# Patient Record
Sex: FEMALE | Race: WHITE | NOT HISPANIC OR LATINO | URBAN - METROPOLITAN AREA
[De-identification: names, ages, dates, MRNs, and addresses within clinical notes are randomized per-mention and may not be internally consistent; named-entity substitution may affect disease eponyms.]

---

## 2017-01-10 ENCOUNTER — OUTPATIENT (OUTPATIENT)
Dept: OUTPATIENT SERVICES | Facility: HOSPITAL | Age: 27
LOS: 1 days | Discharge: HOME | End: 2017-01-10

## 2017-06-27 DIAGNOSIS — R07.9 CHEST PAIN, UNSPECIFIED: ICD-10-CM

## 2018-05-01 ENCOUNTER — EMERGENCY (EMERGENCY)
Facility: HOSPITAL | Age: 28
LOS: 1 days | Discharge: HOME | End: 2018-05-01
Attending: EMERGENCY MEDICINE | Admitting: EMERGENCY MEDICINE

## 2018-05-01 VITALS
TEMPERATURE: 97 F | HEART RATE: 79 BPM | DIASTOLIC BLOOD PRESSURE: 66 MMHG | OXYGEN SATURATION: 99 % | SYSTOLIC BLOOD PRESSURE: 111 MMHG | RESPIRATION RATE: 16 BRPM

## 2018-05-01 DIAGNOSIS — R68.84 JAW PAIN: ICD-10-CM

## 2018-05-01 DIAGNOSIS — K08.89 OTHER SPECIFIED DISORDERS OF TEETH AND SUPPORTING STRUCTURES: ICD-10-CM

## 2018-05-01 DIAGNOSIS — Z79.899 OTHER LONG TERM (CURRENT) DRUG THERAPY: ICD-10-CM

## 2018-05-01 DIAGNOSIS — F17.200 NICOTINE DEPENDENCE, UNSPECIFIED, UNCOMPLICATED: ICD-10-CM

## 2018-05-01 DIAGNOSIS — S02.609A FRACTURE OF MANDIBLE, UNSPECIFIED, INITIAL ENCOUNTER FOR CLOSED FRACTURE: Chronic | ICD-10-CM

## 2018-05-01 DIAGNOSIS — Z86.79 PERSONAL HISTORY OF OTHER DISEASES OF THE CIRCULATORY SYSTEM: ICD-10-CM

## 2018-05-01 DIAGNOSIS — Z87.891 PERSONAL HISTORY OF NICOTINE DEPENDENCE: ICD-10-CM

## 2018-05-01 DIAGNOSIS — K02.9 DENTAL CARIES, UNSPECIFIED: ICD-10-CM

## 2018-05-01 RX ORDER — ALPRAZOLAM 0.25 MG
1 TABLET ORAL
Qty: 0 | Refills: 0 | COMMUNITY

## 2018-05-01 NOTE — ED PROVIDER NOTE - PHYSICAL EXAMINATION
Gen: NAD, pleasant, cooperative  HEENT: NCAT, EOMI, no lymphadenopathy, no pain on palpation of mandible from exterior. On examination of oropharynx, L lower most posterior molar is missing its entire posterior half without any purulent drainage or bleeding  Cards: RRR, S1-S2 present, no G/M/R  Resp: CTAB  Abd: S, NT, ND, no pain on palpation, +BS  Ext: No C/C/E. 2+ radial pulses b/l  Neuro: AOx3, nonfocal

## 2018-05-01 NOTE — ED PROVIDER NOTE - PROGRESS NOTE DETAILS
Pt states she is amenable to receiving a dental block for her pain and following up with the [facility] when it opens later today. Dental resident has come to [facility] Crit area to perform block. Patient to be discharged afterwards to follow-up later today in [facility]. in reference to Procedure / Dental block performed on 5/1/18 (see procedure note): I was present for and supervised the key/critical aspects of the procedures performed during the care of the patient.

## 2018-05-01 NOTE — ED PROVIDER NOTE - ATTENDING CONTRIBUTION TO CARE
27y f h/o svt, spontan L ptx in past p/w worsening LL tooth pain x 3d. H/o LL cracked molar x sev yrs, now w/worsening pain. Started taking PCN she had at home today. Denies f/c, diff swallowing or breathing, drooling, nv, abd pain. Has medical ins but is only covered in [place]. PE: young f nad, ncat, op - +cracked tooth #17, +ttp, mild surrounding gingival erythema, no drainage, no buccal edema, tongue nl, no drooling, no trismus, neck supple no lad, rrr nl s1s2, ctab, ext nl. a/p: LL tooth pain - pt amenable to dental blk - clinic f/u in AM.

## 2018-05-01 NOTE — CONSULT NOTE ADULT - SUBJECTIVE AND OBJECTIVE BOX
Patient is a 27y old  Female who presents with a chief complaint of     HPI: sharp occasional dental pain on lower left side since 4/27.      PAST MEDICAL & SURGICAL HISTORY:  Pneumothorax on left  SVT (supraventricular tachycardia)  Closed fracture of jaw    (   ) heart valve replacement  (   ) joint replacement  (   ) pregnancy    MEDICATIONS  (STANDING):    MEDICATIONS  (PRN):      REVIEW OF SYSTEMS      General:	    Skin/Breast:  	  Ophthalmologic:  	  ENMT:	    Respiratory and Thorax:  	  Cardiovascular:	    Gastrointestinal:	    Genitourinary:	    Musculoskeletal:	    Neurological:	    Psychiatric:	    Hematology/Lymphatics:	    Endocrine:	    Allergic/Immunologic:	    Allergies    No Known Allergies    Intolerances        FAMILY HISTORY:  Family history of acute myocardial infarction (Father)      *SOCIAL HISTORY: (   ) Tobacco; (   ) ETOH    Vital Signs Last 24 Hrs  T(C): 36.2 (01 May 2018 00:52), Max: 36.2 (01 May 2018 00:52)  T(F): 97.1 (01 May 2018 00:52), Max: 97.1 (01 May 2018 00:52)  HR: 79 (01 May 2018 00:52) (79 - 79)  BP: 111/66 (01 May 2018 00:52) (111/66 - 111/66)  BP(mean): --  RR: 16 (01 May 2018 00:52) (16 - 16)  SpO2: 99% (01 May 2018 00:52) (99% - 99%)    LABS:                  Last Dental Visit: <<  >>    EOE:   No TMD, LAD, asymmetry, swelling             TMJ (   ) clicks                     (   ) pops                     (   ) crepitus             Mandible <<FROM>>             Facial bones and MOM <<grossly intact>>             (   ) trismus             (   ) lymphadenopathy             (   ) swelling             (   ) asymmetry             (   ) palpation             (   ) dyspnea             (   ) dysphagia             (   ) loss of consciousness    IOE:     Tooth #18 fractured with gross caries; - palpation and percussion.            <<permanent/primary/mixed>> dentition:            <<grossly intact>> OR             <<multiple carious teeth>> OR             <<multiple missing teeth>>             Dentition Present <<  >>                     Mobility <<  >>                     Caries <<  >>                hard/soft palate:  (   ) palatal torus, <<No pathology noted>>            tongue/FOM <<No pathology noted>>            labial/buccal mucosa <<No pathology noted>>           (   ) percussion           (   ) palpation           (   ) swelling            (   ) abscess           (   ) sinus tract    *DENTAL RADIOGRAPHS: N/A    RADIOLOGY & ADDITIONAL STUDIES:    *ASSESSMENT: Explained to patient that tooth #18 has gross caries and is fractured and needs to be evaluated/follow-up by her private dentist.     *PLAN: dental block    PROCEDURE:   Verbal and written consent given.  Anesthesia: << Administered 1 carpule of Marcaine as left Inferior Alveolar Nerve Block and long buccal. Administered 1 carpule of 2% Lidocaine with 1:100K epinephrine as local infiltration on buccal of #18.  >>   Treatment: <<    >>     RECOMMENDATIONS:  1) << Explained to patient to continue and finish taking prescribed antibiotics and followup with private dentist tomorrow.  >>  2) Dental F/U with outpatient dentist for comprehensive dental care.   3) If any difficulty swallowing/breathing, fever occur, return to ER.     Resident Name, pager # Melodie Schaefer

## 2018-05-01 NOTE — ED PROVIDER NOTE - NS ED ROS FT
Constitutional: No fever, chills, unintended weight loss.  Eyes:  No visual changes, eye pain or discharge.  ENMT:  see hpi  Cardiac:  No chest pain, SOB or edema. No chest pain with exertion.  Respiratory:  No cough or respiratory distress. No hemoptysis. No history of asthma or RAD.  GI:  No nausea, vomiting, diarrhea or abdominal pain.

## 2018-05-02 ENCOUNTER — OUTPATIENT (OUTPATIENT)
Dept: OUTPATIENT SERVICES | Facility: HOSPITAL | Age: 28
LOS: 1 days | Discharge: HOME | End: 2018-05-02

## 2018-05-02 DIAGNOSIS — K02.53 DENTAL CARIES ON PIT AND FISSURE SURFACE PENETRATING INTO PULP: ICD-10-CM

## 2018-05-02 DIAGNOSIS — S02.609A FRACTURE OF MANDIBLE, UNSPECIFIED, INITIAL ENCOUNTER FOR CLOSED FRACTURE: Chronic | ICD-10-CM

## 2019-05-20 NOTE — ED ADULT NURSE NOTE - NS ED NURSE LEVEL OF CONSCIOUSNESS AFFECT
INR not at goal. Medications, chart, and patient findings reviewed. See calendar for adjustments to dose and follow up plan.      
Calm

## 2020-03-08 ENCOUNTER — EMERGENCY (EMERGENCY)
Facility: HOSPITAL | Age: 30
LOS: 0 days | Discharge: HOME | End: 2020-03-08
Attending: EMERGENCY MEDICINE | Admitting: EMERGENCY MEDICINE
Payer: COMMERCIAL

## 2020-03-08 VITALS
TEMPERATURE: 98 F | DIASTOLIC BLOOD PRESSURE: 60 MMHG | SYSTOLIC BLOOD PRESSURE: 108 MMHG | HEART RATE: 87 BPM | RESPIRATION RATE: 16 BRPM | OXYGEN SATURATION: 100 %

## 2020-03-08 VITALS
WEIGHT: 149.91 LBS | RESPIRATION RATE: 18 BRPM | SYSTOLIC BLOOD PRESSURE: 145 MMHG | HEART RATE: 140 BPM | TEMPERATURE: 99 F | DIASTOLIC BLOOD PRESSURE: 65 MMHG | HEIGHT: 66 IN | OXYGEN SATURATION: 98 %

## 2020-03-08 DIAGNOSIS — R00.2 PALPITATIONS: ICD-10-CM

## 2020-03-08 DIAGNOSIS — Z79.899 OTHER LONG TERM (CURRENT) DRUG THERAPY: ICD-10-CM

## 2020-03-08 DIAGNOSIS — S02.609A FRACTURE OF MANDIBLE, UNSPECIFIED, INITIAL ENCOUNTER FOR CLOSED FRACTURE: Chronic | ICD-10-CM

## 2020-03-08 LAB
ALBUMIN SERPL ELPH-MCNC: 4.8 G/DL — SIGNIFICANT CHANGE UP (ref 3.5–5.2)
ALP SERPL-CCNC: 76 U/L — SIGNIFICANT CHANGE UP (ref 30–115)
ALT FLD-CCNC: 33 U/L — SIGNIFICANT CHANGE UP (ref 0–41)
ANION GAP SERPL CALC-SCNC: 13 MMOL/L — SIGNIFICANT CHANGE UP (ref 7–14)
AST SERPL-CCNC: 34 U/L — SIGNIFICANT CHANGE UP (ref 0–41)
BILIRUB SERPL-MCNC: <0.2 MG/DL — SIGNIFICANT CHANGE UP (ref 0.2–1.2)
BUN SERPL-MCNC: 10 MG/DL — SIGNIFICANT CHANGE UP (ref 10–20)
CALCIUM SERPL-MCNC: 9.7 MG/DL — SIGNIFICANT CHANGE UP (ref 8.5–10.1)
CHLORIDE SERPL-SCNC: 101 MMOL/L — SIGNIFICANT CHANGE UP (ref 98–110)
CO2 SERPL-SCNC: 27 MMOL/L — SIGNIFICANT CHANGE UP (ref 17–32)
CREAT SERPL-MCNC: 0.9 MG/DL — SIGNIFICANT CHANGE UP (ref 0.7–1.5)
GLUCOSE SERPL-MCNC: 87 MG/DL — SIGNIFICANT CHANGE UP (ref 70–99)
HCG SERPL-ACNC: <0.6 MIU/ML — SIGNIFICANT CHANGE UP
HCT VFR BLD CALC: 44.1 % — SIGNIFICANT CHANGE UP (ref 37–47)
HGB BLD-MCNC: 14.6 G/DL — SIGNIFICANT CHANGE UP (ref 12–16)
MCHC RBC-ENTMCNC: 30.6 PG — SIGNIFICANT CHANGE UP (ref 27–31)
MCHC RBC-ENTMCNC: 33.1 G/DL — SIGNIFICANT CHANGE UP (ref 32–37)
MCV RBC AUTO: 92.5 FL — SIGNIFICANT CHANGE UP (ref 81–99)
NRBC # BLD: 0 /100 WBCS — SIGNIFICANT CHANGE UP (ref 0–0)
PLATELET # BLD AUTO: 235 K/UL — SIGNIFICANT CHANGE UP (ref 130–400)
POTASSIUM SERPL-MCNC: 4.4 MMOL/L — SIGNIFICANT CHANGE UP (ref 3.5–5)
POTASSIUM SERPL-SCNC: 4.4 MMOL/L — SIGNIFICANT CHANGE UP (ref 3.5–5)
PROT SERPL-MCNC: 7.4 G/DL — SIGNIFICANT CHANGE UP (ref 6–8)
RBC # BLD: 4.77 M/UL — SIGNIFICANT CHANGE UP (ref 4.2–5.4)
RBC # FLD: 13.4 % — SIGNIFICANT CHANGE UP (ref 11.5–14.5)
SODIUM SERPL-SCNC: 141 MMOL/L — SIGNIFICANT CHANGE UP (ref 135–146)
TROPONIN T SERPL-MCNC: <0.01 NG/ML — SIGNIFICANT CHANGE UP
WBC # BLD: 8.82 K/UL — SIGNIFICANT CHANGE UP (ref 4.8–10.8)
WBC # FLD AUTO: 8.82 K/UL — SIGNIFICANT CHANGE UP (ref 4.8–10.8)

## 2020-03-08 PROCEDURE — 71046 X-RAY EXAM CHEST 2 VIEWS: CPT | Mod: 26

## 2020-03-08 PROCEDURE — 93010 ELECTROCARDIOGRAM REPORT: CPT

## 2020-03-08 PROCEDURE — 99285 EMERGENCY DEPT VISIT HI MDM: CPT

## 2020-03-08 RX ORDER — SODIUM CHLORIDE 9 MG/ML
1000 INJECTION, SOLUTION INTRAVENOUS ONCE
Refills: 0 | Status: COMPLETED | OUTPATIENT
Start: 2020-03-08 | End: 2020-03-08

## 2020-03-08 RX ADMIN — SODIUM CHLORIDE 1000 MILLILITER(S): 9 INJECTION, SOLUTION INTRAVENOUS at 20:05

## 2020-03-08 NOTE — ED PROVIDER NOTE - DIAGNOSTIC INTERPRETATION
ER Physician: LEXY Ibanez M.D.  CHEST XRAY INTERPRETATION: lungs clear, heart shadow normal, bony structures intact

## 2020-03-08 NOTE — ED ADULT NURSE NOTE - OBJECTIVE STATEMENT
Patient presents to ED with complaints of mild chest pain that has since resolved and palpitations that started PTA. Patient has history of SVT with spontaneous resolution.

## 2020-03-08 NOTE — ED PROVIDER NOTE - CARE PROVIDER_API CALL
Andrew Palacios; YOANA)  Cardiac Electrophysiology  [address] [place] [address]  Phone: [phone]  Fax: [phone]  Follow Up Time:

## 2020-03-08 NOTE — ED ADULT NURSE NOTE - NS ED NURSE IV DC DT
Complete history and physical was completed today.  Complete and thorough medication reconciliation was performed.  Discussed risks and benefits of medications.  Advised patient on orders and health maintenance.  We discussed old records and old labs if available.  Will request any records not available through epic.  Continue current medications listed on your summary sheet.   08-Mar-2020 21:51

## 2020-03-08 NOTE — ED PROVIDER NOTE - ATTENDING CONTRIBUTION TO CARE
I personally evaluated the patient. I reviewed the Resident’s or Physician Assistant’s note (as assigned above), and agree with the findings and plan except as documented in my note.   30 Y/O F H/O SVT APPROX ONE YEAR AGO WHICH SPONTANEOUSLY CONVERTED NOW C/O PALPITATIONS X 1 HOUR. PT REPORTS HEART RACING AND NOTED HEART RATE TO -150BPM. + ASSOCIATED CHEST TIGHTNESS. NO SOB, DIZZINESS, NAUSEA, DIAPHORESIS. AT TIME OF PRIOR SVT, PT REPORTS TFTS AND ECHO NORMAL. NO LEG EDEMA OR PAIN. NO ABD PAIN. NO RECENT ILLNESS, FEVER CHILLS, URI SXS, COUGH. VITALS NOTED. ALERT OX3 NAD WELL APPEARING. NCAT PERRL. EOMI. OP NORMAL. MMM. NECK SUPPLE. THYROID NORMAL. LUNGS CLEAR B/L. TACHYCARDIA S1S2. ABD- SOFT NONTENDER. NO LEG EDEMA. CN 2-12 INTACT. NEURO EXAM NONFOCAL.

## 2020-03-08 NOTE — ED PROVIDER NOTE - NS ED ROS FT
Eyes:  No visual changes, eye pain or discharge.  ENMT:  No hearing changes, pain, discharge or infections. No neck pain or stiffness.  Cardiac: +palpitations. No chest pain, SOB or edema. No chest pain with exertion.  Respiratory:  No cough or respiratory distress. No hemoptysis. No history of asthma or RAD.  GI:  No nausea, vomiting, diarrhea or abdominal pain.  :  No dysuria, frequency or burning.  MS:  No myalgia, muscle weakness, joint pain or back pain.  Neuro:  No headache or weakness.  No LOC.  Skin:  No skin rash.   Endocrine: No history of thyroid disease or diabetes.

## 2020-03-08 NOTE — ED PROVIDER NOTE - CLINICAL SUMMARY MEDICAL DECISION MAKING FREE TEXT BOX
28 Y/O F H/O ONE EPISODE OF SVT NOW WITH PALPITATIONS. EKG WITH SINUS TACHYCARDIA WHICH IMPROVED IN THE ED. ALL DIAGNOSTIC TESTING REVIEWED. PT DISCHARGED TO FOLOW UP WITH EP AND GIVEN STRICT RETURN INSTRUCTIONS.

## 2020-03-08 NOTE — ED PROVIDER NOTE - PATIENT PORTAL LINK FT
You can access the FollowMyHealth Patient Portal offered by [facility] by registering at the following website: [email or web address]. By joining [employer]’s FollowMyHealth portal, you will also be able to view your health information using other applications (apps) compatible with our system.

## 2020-03-08 NOTE — ED PROVIDER NOTE - PHYSICAL EXAMINATION
CONSTITUTIONAL: Well-developed; well-nourished; in no acute distress.   SKIN: warm, dry  HEAD: Normocephalic; atraumatic.  EYES: PERRL, EOMI, normal sclera and conjunctiva   ENT: No nasal discharge; airway clear.  NECK: Supple; non tender.  CARD: S1, S2 normal; no murmurs, gallops, or rubs. Tachycardia.   RESP: No wheezes, rales or rhonchi.  ABD: soft ntnd  EXT: Normal ROM.  No clubbing, cyanosis or edema. No posterior calf tenderness.   LYMPH: No acute cervical adenopathy.  NEURO: Alert, oriented, grossly unremarkable  PSYCH: Cooperative, appropriate.

## 2020-03-08 NOTE — ED PROVIDER NOTE - OBJECTIVE STATEMENT
29 y F pmh svt pw palpitations. Heart started racing about 1 hour ago. Checked HR which was elevated at that time. Associated with mild chest tightness. Denies fever, chills, cough, sob, leg swelling, nausea, dizziness, LH.

## 2020-03-09 PROBLEM — I47.1 SUPRAVENTRICULAR TACHYCARDIA: Chronic | Status: ACTIVE | Noted: 2018-05-01

## 2020-03-09 PROBLEM — J93.9 PNEUMOTHORAX, UNSPECIFIED: Chronic | Status: ACTIVE | Noted: 2018-05-01

## 2020-03-26 ENCOUNTER — TRANSCRIPTION ENCOUNTER (OUTPATIENT)
Age: 30
End: 2020-03-26

## 2020-04-26 ENCOUNTER — MESSAGE (OUTPATIENT)
Age: 30
End: 2020-04-26

## 2020-05-29 ENCOUNTER — LABORATORY RESULT (OUTPATIENT)
Age: 30
End: 2020-05-29

## 2020-07-30 ENCOUNTER — TRANSCRIPTION ENCOUNTER (OUTPATIENT)
Age: 30
End: 2020-07-30

## 2022-04-27 ENCOUNTER — TRANSCRIPTION ENCOUNTER (OUTPATIENT)
Age: 32
End: 2022-04-27

## 2022-10-19 ENCOUNTER — NON-APPOINTMENT (OUTPATIENT)
Age: 32
End: 2022-10-19

## 2023-01-04 PROBLEM — Z00.00 ENCOUNTER FOR PREVENTIVE HEALTH EXAMINATION: Status: ACTIVE | Noted: 2023-01-04

## 2023-01-26 ENCOUNTER — APPOINTMENT (OUTPATIENT)
Dept: ORTHOPEDIC SURGERY | Facility: CLINIC | Age: 33
End: 2023-01-26
Payer: COMMERCIAL

## 2023-01-26 VITALS — BODY MASS INDEX: 22.76 KG/M2 | HEIGHT: 67 IN | WEIGHT: 145 LBS

## 2023-01-26 DIAGNOSIS — S76.312A STRAIN OF MUSCLE, FASCIA AND TENDON OF THE POSTERIOR MUSCLE GROUP AT THIGH LEVEL, LEFT THIGH, INITIAL ENCOUNTER: ICD-10-CM

## 2023-01-26 PROCEDURE — 99202 OFFICE O/P NEW SF 15 MIN: CPT

## 2023-01-26 NOTE — ASSESSMENT
[FreeTextEntry1] :   32-year-old woman with left hamstring strain.  Patient reassured that these injuries may take upwards of 9 months to resolve.  We had a long discussion about progress of return to lifting activities with continued stretching.  We talked about therapy but I think she is capable of doing exercises on her own.  Recommended a thigh sleeve.  Will follow up on an as-needed basis.  Any problems she is welcome back at any time.  Patient was satisfied with the visit and agreed with the plan.

## 2023-01-26 NOTE — IMAGING
[de-identified] :  pleasant young woman walks into my office in no distress.  Physical examination completed with female MA present.\par \par Physical examination:  Fit young woman.  She is in no distress.  She is able get onto and off of the exam table with no difficulty.  No focal tenderness palpation over the posterior aspect of her thigh.  No asymmetry to the thigh musculature.  Well-developed quadriceps and hamstring musculature.  She is able to touch her hands to her toes suggesting a reasonable supple in this of her hamstrings bilaterally.  Straight leg raise with her knee held in full extension at to greater than 90° flexion elicits some mild discomfort.  Positioning in a dead weight lift stance, the patient reports some pain but she can go through the mechanics without stopping.

## 2023-01-26 NOTE — HISTORY OF PRESENT ILLNESS
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  Has had persistent pain in the area but notes this is slowly been improving.  Pain occurs with positioning for a dead weight lift.  She is avoided lifting but has continue to stretch.  She has been going to yoga but modify activities to mitigate pain in the area of her thigh.  She presents to my office for a checkup.  Denies any prior problems with her hamstring.  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

## 2024-02-09 NOTE — ED ADULT NURSE NOTE - SUICIDE SCREENING QUESTION 3
Plan  Fasting blood work ordered.  You will get this done today.  Can schedule well-woman care in the near future.  If any needed follow-up for labs we can do it at that time      
No

## 2024-09-24 ENCOUNTER — APPOINTMENT (OUTPATIENT)
Dept: OBGYN | Facility: CLINIC | Age: 34
End: 2024-09-24
Payer: COMMERCIAL

## 2024-09-24 VITALS
BODY MASS INDEX: 23.86 KG/M2 | SYSTOLIC BLOOD PRESSURE: 98 MMHG | HEART RATE: 87 BPM | WEIGHT: 152 LBS | DIASTOLIC BLOOD PRESSURE: 60 MMHG | OXYGEN SATURATION: 96 % | TEMPERATURE: 98 F | HEIGHT: 67 IN

## 2024-09-24 DIAGNOSIS — N91.2 AMENORRHEA, UNSPECIFIED: ICD-10-CM

## 2024-09-24 LAB
BILIRUB UR QL STRIP: NORMAL
CLARITY UR: CLEAR
COLLECTION METHOD: NORMAL
GLUCOSE UR-MCNC: NORMAL
HCG UR QL: 0.2 EU/DL
HGB UR QL STRIP.AUTO: NORMAL
KETONES UR-MCNC: NORMAL
LEUKOCYTE ESTERASE UR QL STRIP: NORMAL
NITRITE UR QL STRIP: NORMAL
PH UR STRIP: 7
PROT UR STRIP-MCNC: NORMAL
SP GR UR STRIP: 1.01

## 2024-09-24 PROCEDURE — 99203 OFFICE O/P NEW LOW 30 MIN: CPT

## 2024-09-24 PROCEDURE — 81003 URINALYSIS AUTO W/O SCOPE: CPT | Mod: QW

## 2024-09-24 NOTE — PLAN
[FreeTextEntry1] : lmp 8/17/24 +ucg no complaints pelvic sonogram to be performed for confirmation of pregnancy  rto 2 weeks

## 2024-09-24 NOTE — HISTORY OF PRESENT ILLNESS
[N] : Patient does not use contraception [Y] : Positive pregnancy history [FreeTextEntry1] : PATIENT PRESENTS NEW PREGNANCY [Mammogramdate] : 0 [BreastSonogramDate] : 0 [PapSmeardate] : 2023 [TextBox_31] : AS PER PATIENT  [BoneDensityDate] : 0 [ColonoscopyDate] : 0 [LMPDate] : 8/17/24 [PGHxTotal] : 2 [[facility] Center[facility]] : 1

## 2024-09-25 LAB
ABO + RH PNL BLD: NORMAL
BLD GP AB SCN SERPL QL: NORMAL
HCG SERPL-MCNC: 6620 MIU/ML
PROGEST SERPL-MCNC: 16.01 NG/ML

## 2024-10-07 ENCOUNTER — APPOINTMENT (OUTPATIENT)
Dept: OBGYN | Facility: CLINIC | Age: 34
End: 2024-10-07
Payer: COMMERCIAL

## 2024-10-07 VITALS
DIASTOLIC BLOOD PRESSURE: 60 MMHG | OXYGEN SATURATION: 98 % | SYSTOLIC BLOOD PRESSURE: 90 MMHG | TEMPERATURE: 98.7 F | HEART RATE: 65 BPM | WEIGHT: 156 LBS | BODY MASS INDEX: 24.43 KG/M2

## 2024-10-07 DIAGNOSIS — Z34.90 ENCOUNTER FOR SUPERVISION OF NORMAL PREGNANCY, UNSPECIFIED, UNSPECIFIED TRIMESTER: ICD-10-CM

## 2024-10-07 PROCEDURE — 0500F INITIAL PRENATAL CARE VISIT: CPT

## 2024-10-08 ENCOUNTER — LABORATORY RESULT (OUTPATIENT)
Age: 34
End: 2024-10-08

## 2024-10-08 LAB
ABO + RH PNL BLD: NORMAL
BLD GP AB SCN SERPL QL: NORMAL
ETOH UR SCRN: NEGATIVE
GLUCOSE SERPL-MCNC: 111 MG/DL
HCG SERPL-MCNC: ABNORMAL MIU/ML
HCV AB SER QL: NONREACTIVE
HCV S/CO RATIO: 0.05 COI
PROGEST SERPL-MCNC: 16.65 NG/ML
SIUH URINE DRUG SCREEN 1: NORMAL
TSH SERPL-ACNC: 1.99 UIU/ML

## 2024-10-09 LAB
HBV CORE IGG+IGM SER QL: NONREACTIVE
HBV SURFACE AB SER QL: REACTIVE
HBV SURFACE AG SER QL: NONREACTIVE
HEPATITIS A IGG ANTIBODY: NONREACTIVE
HGB A MFR BLD: 97.2 %
HGB A2 MFR BLD: 2.8 %
HGB FRACT BLD-IMP: NORMAL
HIV1+2 AB SPEC QL IA.RAPID: NONREACTIVE
RUBV IGG FLD-ACNC: 1.46 INDEX
RUBV IGG SER-IMP: POSITIVE
T GONDII AB SER-IMP: NEGATIVE
T GONDII AB SER-IMP: NEGATIVE
T GONDII IGG SER QL: <3 IU/ML
T GONDII IGM SER QL: <3 AU/ML
T PALLIDUM AB SER QL IA: NEGATIVE
VZV AB TITR SER: POSITIVE
VZV IGG SER IF-ACNC: 3.83 S/CO

## 2024-10-10 LAB — BACTERIA UR CULT: NORMAL

## 2024-10-11 LAB
APPEARANCE: CLEAR
BILIRUBIN URINE: NEGATIVE
BLOOD URINE: NEGATIVE
COLOR: YELLOW
GLUCOSE QUALITATIVE U: NEGATIVE MG/DL
KETONES URINE: NEGATIVE MG/DL
LEUKOCYTE ESTERASE URINE: NEGATIVE
NITRITE URINE: NEGATIVE
PH URINE: 6.5
PROTEIN URINE: NEGATIVE MG/DL
SPECIFIC GRAVITY URINE: 1.02
UROBILINOGEN URINE: 0.2 MG/DL

## 2024-10-17 ENCOUNTER — TRANSCRIPTION ENCOUNTER (OUTPATIENT)
Age: 34
End: 2024-10-17

## 2024-10-17 ENCOUNTER — APPOINTMENT (OUTPATIENT)
Dept: OBGYN | Facility: CLINIC | Age: 34
End: 2024-10-17
Payer: COMMERCIAL

## 2024-10-17 VITALS
WEIGHT: 155 LBS | OXYGEN SATURATION: 99 % | DIASTOLIC BLOOD PRESSURE: 60 MMHG | TEMPERATURE: 98.4 F | SYSTOLIC BLOOD PRESSURE: 90 MMHG | BODY MASS INDEX: 24.33 KG/M2 | HEIGHT: 67 IN | HEART RATE: 85 BPM

## 2024-10-17 DIAGNOSIS — R31.21 ASYMPTOMATIC MICROSCOPIC HEMATURIA: ICD-10-CM

## 2024-10-17 PROCEDURE — 0502F SUBSEQUENT PRENATAL CARE: CPT

## 2024-10-19 LAB
APPEARANCE: CLEAR
BACTERIA: NEGATIVE /HPF
BILIRUBIN URINE: NEGATIVE
BLOOD URINE: NEGATIVE
CAST: 0 /LPF
COLOR: YELLOW
EPITHELIAL CELLS: 0 /HPF
GLUCOSE QUALITATIVE U: NEGATIVE MG/DL
KETONES URINE: NEGATIVE MG/DL
LEUKOCYTE ESTERASE URINE: NEGATIVE
MICROSCOPIC-UA: NORMAL
NITRITE URINE: NEGATIVE
PH URINE: 6
PROTEIN URINE: NEGATIVE MG/DL
RED BLOOD CELLS URINE: 0 /HPF
SPECIFIC GRAVITY URINE: 1.01
UROBILINOGEN URINE: 0.2 MG/DL
WHITE BLOOD CELLS URINE: 0 /HPF

## 2024-10-21 LAB — BACTERIA UR CULT: NORMAL

## 2024-10-24 ENCOUNTER — NON-APPOINTMENT (OUTPATIENT)
Age: 34
End: 2024-10-24

## 2024-10-24 ENCOUNTER — APPOINTMENT (OUTPATIENT)
Dept: OBGYN | Facility: CLINIC | Age: 34
End: 2024-10-24
Payer: COMMERCIAL

## 2024-10-24 VITALS
DIASTOLIC BLOOD PRESSURE: 60 MMHG | BODY MASS INDEX: 24.12 KG/M2 | OXYGEN SATURATION: 98 % | TEMPERATURE: 98.2 F | HEART RATE: 68 BPM | SYSTOLIC BLOOD PRESSURE: 98 MMHG | WEIGHT: 154 LBS

## 2024-10-24 DIAGNOSIS — Z13.0 ENCOUNTER FOR SCREENING FOR DISEASES OF THE BLOOD AND BLOOD-FORMING ORGANS AND CERTAIN DISORDERS INVOLVING THE IMMUNE MECHANISM: ICD-10-CM

## 2024-10-24 PROCEDURE — 0502F SUBSEQUENT PRENATAL CARE: CPT

## 2024-10-28 LAB
BASOPHILS # BLD AUTO: 0.06 K/UL
BASOPHILS NFR BLD AUTO: 0.6 %
EOSINOPHIL # BLD AUTO: 0.12 K/UL
EOSINOPHIL NFR BLD AUTO: 1.1 %
HCT VFR BLD CALC: 41.9 %
HGB BLD-MCNC: 13.7 G/DL
IMM GRANULOCYTES NFR BLD AUTO: 0.4 %
LYMPHOCYTES # BLD AUTO: 2.42 K/UL
LYMPHOCYTES NFR BLD AUTO: 22.5 %
MAN DIFF?: NORMAL
MCHC RBC-ENTMCNC: 29.6 PG
MCHC RBC-ENTMCNC: 32.7 G/DL
MCV RBC AUTO: 90.5 FL
MONOCYTES # BLD AUTO: 0.49 K/UL
MONOCYTES NFR BLD AUTO: 4.6 %
NEUTROPHILS # BLD AUTO: 7.62 K/UL
NEUTROPHILS NFR BLD AUTO: 70.8 %
PLATELET # BLD AUTO: 231 K/UL
PMV BLD AUTO: 0 /100 WBCS
RBC # BLD: 4.63 M/UL
RBC # FLD: 12.3 %
WBC # FLD AUTO: 10.75 K/UL

## 2024-11-12 ENCOUNTER — APPOINTMENT (OUTPATIENT)
Dept: OBGYN | Facility: CLINIC | Age: 34
End: 2024-11-12
Payer: COMMERCIAL

## 2024-11-12 VITALS
HEART RATE: 84 BPM | BODY MASS INDEX: 24.43 KG/M2 | WEIGHT: 156 LBS | OXYGEN SATURATION: 99 % | TEMPERATURE: 98.2 F | DIASTOLIC BLOOD PRESSURE: 60 MMHG | SYSTOLIC BLOOD PRESSURE: 102 MMHG

## 2024-11-12 DIAGNOSIS — Q99.9 CHROMOSOMAL ABNORMALITY, UNSPECIFIED: ICD-10-CM

## 2024-11-12 DIAGNOSIS — Z13.89 ENCOUNTER FOR SCREENING FOR OTHER DISORDER: ICD-10-CM

## 2024-11-12 PROCEDURE — 0502F SUBSEQUENT PRENATAL CARE: CPT

## 2024-11-22 LAB
FETAL FRACTION: NORMAL
GENDER OF FETUS: NORMAL
GENE DIS ANL CARRIER INTERP-IMP: NORMAL
MONOSOMY X AGE-BASED RISK: NORMAL
MONOSOMY X RESULT: NORMAL
MONOSOMY X RISK AFTER TEST: NORMAL
PANORAMA SCREEN: NORMAL
RPT COMMENT: NORMAL
TEST PERFORMANCE INFO SPEC: NORMAL
TRIPLOIDY RESULT: NORMAL
TRISOMY 13 AGE-BASED RISK: NORMAL
TRISOMY 13 RESULT: NORMAL
TRISOMY 13 RISK AFTER TEST: NORMAL
TRISOMY 18 AGE-BASED RISK: NORMAL
TRISOMY 18 RESULT: NORMAL
TRISOMY 18 RISK AFTER TEST: NORMAL
TRISOMY 21 RESULT: NORMAL
TRISOMY 21 RISK AFTER TEST: NORMAL

## 2024-12-03 ENCOUNTER — NON-APPOINTMENT (OUTPATIENT)
Age: 34
End: 2024-12-03

## 2024-12-03 ENCOUNTER — APPOINTMENT (OUTPATIENT)
Dept: OBGYN | Facility: CLINIC | Age: 34
End: 2024-12-03

## 2024-12-03 VITALS
DIASTOLIC BLOOD PRESSURE: 70 MMHG | BODY MASS INDEX: 25.22 KG/M2 | OXYGEN SATURATION: 99 % | HEART RATE: 82 BPM | TEMPERATURE: 98.2 F | SYSTOLIC BLOOD PRESSURE: 106 MMHG | WEIGHT: 161 LBS

## 2024-12-03 PROCEDURE — 0502F SUBSEQUENT PRENATAL CARE: CPT

## 2024-12-05 LAB
AFP INTERP SERPL-IMP: NORMAL
AFP INTERP SERPL-IMP: NORMAL
AFP MOM CUT-OFF: 2.5
AFP MOM SERPL: 0.47
AFP PERCENTILE: 0.9
AFP SERPL-ACNC: 22.4 NG/ML
CARBAMAZEPINE?: NO
CURRENT SMOKER: NORMAL
DIABETES STATUS PATIENT: NORMAL
GA: NORMAL
GESTATIONAL AGE METHOD: NORMAL
HX OF NTD NARR: NORMAL
MULTIPLE PREGNANCY: NORMAL
NEURAL TUBE DEFECT RISK FETUS: NORMAL
NEURAL TUBE DEFECT RISK POP: NORMAL
RECOM F/U: NO
TEST PERFORMANCE INFO SPEC: NORMAL
VALPROIC ACID?: NORMAL

## 2024-12-23 ENCOUNTER — APPOINTMENT (OUTPATIENT)
Dept: OBGYN | Facility: CLINIC | Age: 34
End: 2024-12-23

## 2025-01-06 ENCOUNTER — NON-APPOINTMENT (OUTPATIENT)
Age: 35
End: 2025-01-06

## 2025-01-09 ENCOUNTER — NON-APPOINTMENT (OUTPATIENT)
Age: 35
End: 2025-01-09

## 2025-01-21 ENCOUNTER — APPOINTMENT (OUTPATIENT)
Dept: OBGYN | Facility: CLINIC | Age: 35
End: 2025-01-21
Payer: COMMERCIAL

## 2025-01-21 ENCOUNTER — APPOINTMENT (OUTPATIENT)
Dept: OBGYN | Facility: CLINIC | Age: 35
End: 2025-01-21

## 2025-01-21 VITALS
SYSTOLIC BLOOD PRESSURE: 100 MMHG | HEART RATE: 86 BPM | OXYGEN SATURATION: 98 % | DIASTOLIC BLOOD PRESSURE: 60 MMHG | BODY MASS INDEX: 26.31 KG/M2 | WEIGHT: 168 LBS | TEMPERATURE: 98.5 F

## 2025-01-21 PROCEDURE — 0502F SUBSEQUENT PRENATAL CARE: CPT

## 2025-02-11 ENCOUNTER — APPOINTMENT (OUTPATIENT)
Dept: OBGYN | Facility: CLINIC | Age: 35
End: 2025-02-11
Payer: COMMERCIAL

## 2025-02-11 VITALS
WEIGHT: 173 LBS | DIASTOLIC BLOOD PRESSURE: 60 MMHG | HEIGHT: 67 IN | SYSTOLIC BLOOD PRESSURE: 96 MMHG | HEART RATE: 112 BPM | OXYGEN SATURATION: 99 % | BODY MASS INDEX: 27.15 KG/M2

## 2025-02-11 DIAGNOSIS — Z13.1 ENCOUNTER FOR SCREENING FOR DIABETES MELLITUS: ICD-10-CM

## 2025-02-11 PROCEDURE — 0502F SUBSEQUENT PRENATAL CARE: CPT

## 2025-02-13 LAB
BASOPHILS # BLD AUTO: 0.06 K/UL
BASOPHILS NFR BLD AUTO: 0.5 %
EOSINOPHIL # BLD AUTO: 0.14 K/UL
EOSINOPHIL NFR BLD AUTO: 1.2 %
GLUCOSE 1H P 100 G GLC PO SERPL-MCNC: 154 MG/DL
HCT VFR BLD CALC: 38.7 %
HGB BLD-MCNC: 12.7 G/DL
IMM GRANULOCYTES NFR BLD AUTO: 1 %
LYMPHOCYTES # BLD AUTO: 1.72 K/UL
LYMPHOCYTES NFR BLD AUTO: 14.2 %
MAN DIFF?: NORMAL
MCHC RBC-ENTMCNC: 30.3 PG
MCHC RBC-ENTMCNC: 32.8 G/DL
MCV RBC AUTO: 92.4 FL
MONOCYTES # BLD AUTO: 0.45 K/UL
MONOCYTES NFR BLD AUTO: 3.7 %
NEUTROPHILS # BLD AUTO: 9.64 K/UL
NEUTROPHILS NFR BLD AUTO: 79.4 %
PLATELET # BLD AUTO: 237 K/UL
PMV BLD AUTO: 0 /100 WBCS
PMV BLD: 10.3 FL
RBC # BLD: 4.19 M/UL
RBC # FLD: 13.2 %
WBC # FLD AUTO: 12.13 K/UL

## 2025-02-14 DIAGNOSIS — R73.09 OTHER ABNORMAL GLUCOSE: ICD-10-CM

## 2025-02-14 LAB — T PALLIDUM AB SER QL IA: NEGATIVE

## 2025-02-25 ENCOUNTER — APPOINTMENT (OUTPATIENT)
Dept: OBGYN | Facility: CLINIC | Age: 35
End: 2025-02-25
Payer: COMMERCIAL

## 2025-02-25 VITALS
BODY MASS INDEX: 27.25 KG/M2 | SYSTOLIC BLOOD PRESSURE: 102 MMHG | TEMPERATURE: 98.2 F | WEIGHT: 174 LBS | HEART RATE: 100 BPM | DIASTOLIC BLOOD PRESSURE: 66 MMHG | OXYGEN SATURATION: 97 %

## 2025-02-25 PROCEDURE — 0502F SUBSEQUENT PRENATAL CARE: CPT

## 2025-02-26 ENCOUNTER — APPOINTMENT (OUTPATIENT)
Dept: OBGYN | Facility: CLINIC | Age: 35
End: 2025-02-26
Payer: COMMERCIAL

## 2025-02-26 VITALS
BODY MASS INDEX: 27.31 KG/M2 | OXYGEN SATURATION: 99 % | HEART RATE: 87 BPM | DIASTOLIC BLOOD PRESSURE: 60 MMHG | TEMPERATURE: 97.9 F | SYSTOLIC BLOOD PRESSURE: 98 MMHG | WEIGHT: 174 LBS | HEIGHT: 67 IN

## 2025-02-26 DIAGNOSIS — N76.0 ACUTE VAGINITIS: ICD-10-CM

## 2025-02-26 DIAGNOSIS — Z11.3 ENCOUNTER FOR SCREENING FOR INFECTIONS WITH A PREDOMINANTLY SEXUAL MODE OF TRANSMISSION: ICD-10-CM

## 2025-02-26 DIAGNOSIS — R31.9 HEMATURIA, UNSPECIFIED: ICD-10-CM

## 2025-02-26 PROCEDURE — 0502F SUBSEQUENT PRENATAL CARE: CPT

## 2025-02-27 LAB
APPEARANCE: CLEAR
BACTERIA: NEGATIVE /HPF
BILIRUBIN URINE: NEGATIVE
BLOOD URINE: NEGATIVE
CAST: 0 /LPF
COLOR: YELLOW
EPITHELIAL CELLS: 0 /HPF
GLUCOSE QUALITATIVE U: NEGATIVE MG/DL
KETONES URINE: NEGATIVE MG/DL
LEUKOCYTE ESTERASE URINE: NEGATIVE
MICROSCOPIC-UA: NORMAL
NITRITE URINE: NEGATIVE
PH URINE: 6
PROTEIN URINE: NEGATIVE MG/DL
RED BLOOD CELLS URINE: 1 /HPF
SPECIFIC GRAVITY URINE: 1.02
UROBILINOGEN URINE: 0.2 MG/DL
WHITE BLOOD CELLS URINE: 0 /HPF

## 2025-02-28 LAB — BACTERIA UR CULT: NORMAL

## 2025-03-01 LAB
A VAGINAE DNA VAG QL NAA+PROBE: ABNORMAL
BVAB2 DNA VAG QL NAA+PROBE: ABNORMAL
C KRUSEI DNA VAG QL NAA+PROBE: NEGATIVE
C TRACH RRNA SPEC QL NAA+PROBE: NEGATIVE
CANDIDA DNA VAG QL NAA+PROBE: NEGATIVE
MEGA1 DNA VAG QL NAA+PROBE: ABNORMAL
N GONORRHOEA RRNA SPEC QL NAA+PROBE: NEGATIVE
T VAGINALIS RRNA SPEC QL NAA+PROBE: NEGATIVE

## 2025-03-03 ENCOUNTER — APPOINTMENT (OUTPATIENT)
Dept: OBGYN | Facility: CLINIC | Age: 35
End: 2025-03-03
Payer: COMMERCIAL

## 2025-03-03 VITALS
BODY MASS INDEX: 27.1 KG/M2 | TEMPERATURE: 97.9 F | HEART RATE: 86 BPM | DIASTOLIC BLOOD PRESSURE: 60 MMHG | SYSTOLIC BLOOD PRESSURE: 100 MMHG | WEIGHT: 173 LBS | OXYGEN SATURATION: 98 %

## 2025-03-03 PROCEDURE — 0502F SUBSEQUENT PRENATAL CARE: CPT

## 2025-03-25 ENCOUNTER — APPOINTMENT (OUTPATIENT)
Dept: OBGYN | Facility: CLINIC | Age: 35
End: 2025-03-25
Payer: COMMERCIAL

## 2025-03-25 VITALS
HEIGHT: 67 IN | HEART RATE: 86 BPM | TEMPERATURE: 97.8 F | OXYGEN SATURATION: 98 % | SYSTOLIC BLOOD PRESSURE: 108 MMHG | BODY MASS INDEX: 27.94 KG/M2 | DIASTOLIC BLOOD PRESSURE: 60 MMHG | WEIGHT: 178 LBS

## 2025-03-25 PROCEDURE — 0502F SUBSEQUENT PRENATAL CARE: CPT
